# Patient Record
Sex: FEMALE | Race: OTHER | NOT HISPANIC OR LATINO
[De-identification: names, ages, dates, MRNs, and addresses within clinical notes are randomized per-mention and may not be internally consistent; named-entity substitution may affect disease eponyms.]

---

## 2024-04-16 ENCOUNTER — NON-APPOINTMENT (OUTPATIENT)
Age: 39
End: 2024-04-16

## 2024-04-16 PROBLEM — Z00.00 ENCOUNTER FOR PREVENTIVE HEALTH EXAMINATION: Status: ACTIVE | Noted: 2024-04-16

## 2024-04-21 ENCOUNTER — NON-APPOINTMENT (OUTPATIENT)
Age: 39
End: 2024-04-21

## 2024-06-13 ENCOUNTER — APPOINTMENT (OUTPATIENT)
Dept: BARIATRICS/WEIGHT MGMT | Facility: CLINIC | Age: 39
End: 2024-06-13
Payer: COMMERCIAL

## 2024-06-13 VITALS
WEIGHT: 288 LBS | SYSTOLIC BLOOD PRESSURE: 140 MMHG | HEIGHT: 69 IN | HEART RATE: 96 BPM | RESPIRATION RATE: 16 BRPM | DIASTOLIC BLOOD PRESSURE: 87 MMHG | OXYGEN SATURATION: 98 % | BODY MASS INDEX: 42.65 KG/M2

## 2024-06-13 DIAGNOSIS — Z82.49 FAMILY HISTORY OF ISCHEMIC HEART DISEASE AND OTHER DISEASES OF THE CIRCULATORY SYSTEM: ICD-10-CM

## 2024-06-13 DIAGNOSIS — Z91.89 OTHER SPECIFIED PERSONAL RISK FACTORS, NOT ELSEWHERE CLASSIFIED: ICD-10-CM

## 2024-06-13 DIAGNOSIS — F32.A ANXIETY DISORDER, UNSPECIFIED: ICD-10-CM

## 2024-06-13 DIAGNOSIS — F41.9 ANXIETY DISORDER, UNSPECIFIED: ICD-10-CM

## 2024-06-13 DIAGNOSIS — M76.829 POSTERIOR TIBIAL TENDINITIS, UNSPECIFIED LEG: ICD-10-CM

## 2024-06-13 DIAGNOSIS — Z83.49 FAMILY HISTORY OF OTHER ENDOCRINE, NUTRITIONAL AND METABOLIC DISEASES: ICD-10-CM

## 2024-06-13 DIAGNOSIS — R73.03 PREDIABETES.: ICD-10-CM

## 2024-06-13 DIAGNOSIS — Z83.3 FAMILY HISTORY OF DIABETES MELLITUS: ICD-10-CM

## 2024-06-13 DIAGNOSIS — E66.01 MORBID (SEVERE) OBESITY DUE TO EXCESS CALORIES: ICD-10-CM

## 2024-06-13 PROCEDURE — G2211 COMPLEX E/M VISIT ADD ON: CPT

## 2024-06-13 PROCEDURE — 99205 OFFICE O/P NEW HI 60 MIN: CPT

## 2024-06-13 RX ORDER — TIRZEPATIDE 2.5 MG/.5ML
2.5 INJECTION, SOLUTION SUBCUTANEOUS
Qty: 1 | Refills: 1 | Status: ACTIVE | COMMUNITY
Start: 2024-06-13 | End: 1900-01-01

## 2024-06-13 NOTE — HISTORY OF PRESENT ILLNESS
[FreeTextEntry1] : 38F PMH class III obesity, prediabetes, post-tibial tendonitis R foot, who presents to weight management for initial evaluation.   Weight/Diet History: Struggled with weight since preteen years Has engaged in numerous weight loss interventions, including Weight Watchers, Intermittent Fasting, tracking intake, no carbs or sugar. Has worked with a . She took phentermine 37.5 mg in ~2011, for ~6 months, while dieting/exercise, lost 50-60 lbs, slowly returned, got to 185 lbs.  Her highest adult weight is now.    Weight today: 288 lbs, BMI 42.53, BF  55%   Diet:  B (7 am): Sometimes skips, iced coffee or frappe L (1-2 pm): On the go, chicken wrap or fast food D (7:30): Fast food Dessert: Snack: no Night-time eating: ice cream, 'a lot', 1/2 to full pint Beverages: iced coffee, frap, water, regular coke 1-2 Binging: Fast-food/Restaurants: Fast food ([employer]t, [employer]) ~10 meals per week Cook/Prepare meals: Not much, time, doesn't enjoy.  Added oils: Food Journal: no   Exercise: Some limitations with foot pain, irregular cardio.    Sleep: "not great", estimates 5 hours per night. Roughly 11 pm to 5 am. Does snore, recently.    Social Hx: No tobacco, has 2 alcohol beverages per month. Self-employed, started an architectual firm. Lives alone.  No FHx thyroid cancer.  Labs (4/15/24): A1c 5.9%, , HDL 59, Trig 53, CBC, CMP, TSH

## 2024-06-13 NOTE — ASSESSMENT
[FreeTextEntry1] : 38F PMH class III obesity, prediabetes, post-tibial tendonitis R foot, who presents to weight management for initial evaluation.   # Class III obesity c/b prediabetes: Weight today 288 lbs, BMI 42.53, BF 55%, has struggled with her weight since her preteen years, engaged in numerous weight loss interventions without sustained success. Did well on phentermine 37.5 mg/d in the past (2011, 6 months therapy) losing 50+ pounds, eventually regained and is now at her heaviest adult weight. Her diet is noted for frequent take-out/fast-food, liquid kcal (ie sugar), some calorie dense snacks / night-eating (ie ice cream). Sedentary lifestyle, in part limited by foot pain. Sleep may be inadequate, some suspicion for PRANEETH although reports snoring only started with weight gain.  - Food log - Meal planning/prep - May benefit from meal delivery service to reduce dependence on fast food - Limit liquid kcal (ie soda), may consider replacing with caffeine - Aim to cut out night eating / ice cream - Dietician referral, will defer at this moment. - Will build exercise with weight loss - Will consider sleep eval if snoring continues - Discussed medical interventions in depth, Zepbound prescribed w/coupon - F/u 1 month after starting   Z... papRy/Oz... Qs/Con

## 2024-07-12 ENCOUNTER — APPOINTMENT (OUTPATIENT)
Dept: BARIATRICS/WEIGHT MGMT | Facility: CLINIC | Age: 39
End: 2024-07-12
Payer: COMMERCIAL

## 2024-07-12 VITALS — BODY MASS INDEX: 42.21 KG/M2 | WEIGHT: 285 LBS | HEIGHT: 69 IN

## 2024-07-12 DIAGNOSIS — R73.03 PREDIABETES.: ICD-10-CM

## 2024-07-12 DIAGNOSIS — E66.01 MORBID (SEVERE) OBESITY DUE TO EXCESS CALORIES: ICD-10-CM

## 2024-07-12 PROCEDURE — 99214 OFFICE O/P EST MOD 30 MIN: CPT

## 2024-07-12 PROCEDURE — G2211 COMPLEX E/M VISIT ADD ON: CPT

## 2024-07-12 RX ORDER — NALTREXONE HYDROCHLORIDE AND BUPROPION HYDROCHLORIDE 8; 90 MG/1; MG/1
8-90 TABLET, EXTENDED RELEASE ORAL TWICE DAILY
Qty: 120 | Refills: 0 | Status: ACTIVE | COMMUNITY
Start: 2024-07-12 | End: 1900-01-01

## 2024-07-12 RX ORDER — NALTREXONE HYDROCHLORIDE AND BUPROPION HYDROCHLORIDE 8; 90 MG/1; MG/1
8-90 TABLET, EXTENDED RELEASE ORAL
Qty: 42 | Refills: 0 | Status: ACTIVE | COMMUNITY
Start: 2024-07-12 | End: 1900-01-01

## 2024-09-20 ENCOUNTER — APPOINTMENT (OUTPATIENT)
Dept: BARIATRICS/WEIGHT MGMT | Facility: CLINIC | Age: 39
End: 2024-09-20
Payer: COMMERCIAL

## 2024-09-20 DIAGNOSIS — R73.03 PREDIABETES.: ICD-10-CM

## 2024-09-20 DIAGNOSIS — E66.01 MORBID (SEVERE) OBESITY DUE TO EXCESS CALORIES: ICD-10-CM

## 2024-09-20 PROCEDURE — G2211 COMPLEX E/M VISIT ADD ON: CPT

## 2024-09-20 PROCEDURE — 99214 OFFICE O/P EST MOD 30 MIN: CPT

## 2024-09-20 NOTE — PHYSICAL EXAM
[Obese, well nourished, in no acute distress] : obese, well nourished, in no acute distress [de-identified] : TEB visit

## 2024-09-20 NOTE — HISTORY OF PRESENT ILLNESS
[Home] : at home, [unfilled] , at the time of the visit. [Medical Office: ([facility])___] : at the medical office located in  [Verbal consent obtained from patient] : the patient, [unfilled] [FreeTextEntry1] : 39F PMH class III obesity, prediabetes, post-tibial tendonitis R foot, who presents to weight management for follow-up.   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 She took phentermine 37.5 mg/d in the past (2011, 6 months therapy) losing 50+ pounds, but eventually regained to her then heaviest weight. Her diet was noted for frequent take-out/fast-food, liquid kcal (ie sugar), some calorie dense snacks / night-eating (ie ice cream). Sedentary lifestyle, in part limited by foot pain. Sleep likely inadequate, some suspicion for PRANEETH although reported snoring only started with weight gain.  We discussed lifestyle and dietary interventions. We discussed medical treatment options, she was prescribed Zepbound, unable to get it so we started Contrave.    Weight today: No recent weights, but dropped a pants size.  Weight at Last Visit (7/12/24): 285 lbs, BMI 42.09 Weight at Initial Visit (6/13/24): 288 lbs, BMI 42.53, BF  55%   Medication: Was on Contrave for 2 weeks, off Contrave for bit as she was worried in regards to work issues (had some nausea), just restarted this week. Dropped slowly off the med.   (Didn't start medication due to zepbound shortages.   Diet: Hasn't been prepping meals but is intermittent fasting, and cut out ice cream.    Exercise: Walking more for work (on site) and on her own.    Sleep:  ("not great", estimates 5 hours per night. Roughly 11 pm to 5 am. Does snore, recently.   Labs (4/15/24): A1c 5.9%, , HDL 59, Trig 53, CBC, CMP, TSH

## 2024-09-20 NOTE — ASSESSMENT
[FreeTextEntry1] : 39F PMH class III obesity, prediabetes, post-tibial tendonitis R foot, who presents to weight management for follow-up.   # Class III obesity c/b prediabetes: No recent weights, but dropped a pants size. Briefly started contrave, restarted it. Stopped prepping meals but intermittent fasting and cut out ice cream. Walking more.  - Food log - Meal planning/prep - May benefit from meal delivery service to reduce dependence on fast food - Limit liquid kcal (ie soda), may consider replacing with caffeine - Aim to cut out night eating / ice cream - Dietician referral, will defer at this moment. - Will build exercise with weight loss - Will consider sleep eval if snoring continues - C/w Contrave, may consider switch to semaglutide at next visit.  - F/u in ~6 weeks   Z... papRy/Oz... Qs/Con

## 2024-09-20 NOTE — PHYSICAL EXAM
[Obese, well nourished, in no acute distress] : obese, well nourished, in no acute distress [de-identified] : TEB visit

## 2024-09-20 NOTE — HISTORY OF PRESENT ILLNESS
[Home] : at home, [unfilled] , at the time of the visit. [Medical Office: ([facility])___] : at the medical office located in  [Verbal consent obtained from patient] : the patient, [unfilled] [FreeTextEntry1] : 39F PMH class III obesity, prediabetes, post-tibial tendonitis R foot, who presents to weight management for follow-up.   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 She took phentermine 37.5 mg/d in the past (2011, 6 months therapy) losing 50+ pounds, but eventually regained to her then heaviest weight. Her diet was noted for frequent take-out/fast-food, liquid kcal (ie sugar), some calorie dense snacks / night-eating (ie ice cream). Sedentary lifestyle, in part limited by foot pain. Sleep likely inadequate, some suspicion for PRANEETH although reported snoring only started with weight gain.  We discussed lifestyle and dietary interventions. We discussed medical treatment options, she was prescribed Zepbound, unable to get it so we started Contrave.    Weight today: No recent weights, but dropped a pants size.  Weight at Last Visit (7/12/24): 285 lbs, BMI 42.09 Weight at Initial Visit (6/13/24): 288 lbs, BMI 42.53, BF  55%   Medication: Was on Contrave for 2 weeks, off Contrave for bit as she was worried in regards to work issues (had some nausea), just restarted this week. Dropped slowly off the med.   (Didn't start medication due to zepbound shortages.   Diet: Hasn't been prepping meals but is intermittent fasting, and cut out ice cream.    Exercise: Walking more for work (on site) and on her own.    Sleep:  ("not great", estimates 5 hours per night. Roughly 11 pm to 5 am. Does snore, recently.   Labs (4/15/24): A1c 5.9%, , HDL 59, Trig 53, CBC, CMP, TSH

## 2024-11-01 ENCOUNTER — APPOINTMENT (OUTPATIENT)
Dept: BARIATRICS/WEIGHT MGMT | Facility: CLINIC | Age: 39
End: 2024-11-01